# Patient Record
Sex: MALE | URBAN - METROPOLITAN AREA
[De-identification: names, ages, dates, MRNs, and addresses within clinical notes are randomized per-mention and may not be internally consistent; named-entity substitution may affect disease eponyms.]

---

## 2017-03-15 ENCOUNTER — ALLIED HEALTH/NURSE VISIT (OUTPATIENT)
Dept: PHARMACY | Facility: CLINIC | Age: 57
End: 2017-03-15
Payer: COMMERCIAL

## 2017-03-15 DIAGNOSIS — K21.9 GASTROESOPHAGEAL REFLUX DISEASE WITHOUT ESOPHAGITIS: ICD-10-CM

## 2017-03-15 DIAGNOSIS — I10 BENIGN ESSENTIAL HYPERTENSION: ICD-10-CM

## 2017-03-15 DIAGNOSIS — L73.9 FOLLICULITIS: ICD-10-CM

## 2017-03-15 DIAGNOSIS — G47.00 INSOMNIA, UNSPECIFIED TYPE: ICD-10-CM

## 2017-03-15 DIAGNOSIS — E78.5 HYPERLIPIDEMIA, UNSPECIFIED HYPERLIPIDEMIA TYPE: ICD-10-CM

## 2017-03-15 DIAGNOSIS — M19.90 ARTHRITIS: Primary | ICD-10-CM

## 2017-03-15 DIAGNOSIS — K59.03 DRUG-INDUCED CONSTIPATION: ICD-10-CM

## 2017-03-15 PROCEDURE — 99605 MTMS BY PHARM NP 15 MIN: CPT | Mod: U4 | Performed by: PHARMACIST

## 2017-03-15 PROCEDURE — 99607 MTMS BY PHARM ADDL 15 MIN: CPT | Mod: U4 | Performed by: PHARMACIST

## 2017-03-15 NOTE — PROGRESS NOTES
SUBJECTIVE/OBJECTIVE:                                                    Justin Stewart is a 56 year old male called for a follow-up visit for Medication Therapy Management.  He was referred to me from [facility]. His PCP is Dr. Higinio Marsh. This is patient's first visit this year.    Chief Complaint: none, follow up from our visit on 9/7/2016.      Tobacco: No tobacco use   Alcohol: 1-3 beverages / week    Medication Adherence: no issues reported by patient    Arthritis/Pain: Medication includes hydrocodone-acetaminophen 5-325 mg patient is taking one tablet daily for arthritis and a bulging disc in his back. He takes 1/2 tablet twice daily. He no longer is taking tramadol. Also using topical lidocaine ointment and diclofenac gel. Using Tylenol 500 - 1000 mg as needed, Cymbalta 60mg daily, Celebrex 200mg once daily. Current arthritis in lower lumbar and thoracic. Mobility is cut down but patient notes that his pain feels managed. Only taking Tylenol occasionally such as when golfing. Patient stated he had a problem with his kidney function so his PCP tried stopping Celebrex. Patient stated that his pain was too bad so he started retaking this again. Patient states that his labs came back and kidney function has since improved.    Hyperlipidemia: Current medications include atorvastatin 20mg once daily. Patient states no significant myalgias or other side effects. He is getting labs drawn next week - patient states will send labs to me.     Hypertension: Current medications include lisinopril 10mg once daily, propranolol ER 80mg once daily, HCTZ 25mg once daily in the morning. Patient denies any side effects and states that his BP is at goal of <140/90.     Insomnia: Current medications include trazodone 100mg at bedtime, working well. Patient does not report any side effects.     Folliculitis: Current medication includes doxycycline 50 mg twice daily - Oracea was not covered by insurance. Folliculitis is  stable, followed by dermatology - states the doxycycline is preventing the folliculitis from getting worse.     Reflux: Current medication includes generic esomeprazole 40 mg once daily. Patient states he is still waiting for a PA for brand name Nexium. He states that about 2 months ago he no longer is having problems with reflux, continues to take generic esomeprazole.     Constipation: patient was started on Movantik 25 mg once daily to prevent constipation while taking hydrocodone-acetaminophen. Not have problems with constipation. Tolerating well.    Current labs include:    Total Cholesterol: 120 mg/dl    6/4/2014  Triglycerides 152 mg/dl     6/4/2014  LDL  120 mg/dl   6/4/2014  HDL  30 mg/dl    6/4/2014    ASSESSMENT:                                                    Current medications were reviewed today as discussed above.      Medication Adherence: no issues identified    Arthritis/Pain: stable    Hyperlipidemia: Stable. Pt is on moderate intensity statin which is indicated based on 2013 ACC/AHA guidelines for lipid management.      Hypertension: Stable. Patient is meeting BP goal of < 140/90mmHg.     Insomnia: stable     Folliculitis: stable    Reflux: improved    Constipation: stable     PLAN:                                                      1. Continue current drug therapy.   2. Patient to send me his labs - he has a release of information form.     I spent 20 minutes with this patient today.  I offer these suggestions with the understanding that I don't fully understand Justin's past medical history and the complexity of his health conditions. Justin should make no changes without the approval of his physician. A copy of the visit note was provided to the patient's primary care provider.     Will follow up in 6 months.    The patient was mailed a summary of these recommendations as an after visit summary.    Nicky Zhao, PharmD  Medication Therapy Management

## 2017-03-15 NOTE — MR AVS SNAPSHOT
"              After Visit Summary   3/15/2017    Justin Stewart    MRN: [id number]           Patient Information     Date Of Birth          1960        Visit Information        Provider Department      3/15/2017 3:30 PM Nicky Zhao [facility] [place]        Today's Diagnoses     Arthritis    -  1    Benign essential hypertension        Folliculitis        Hyperlipidemia, unspecified hyperlipidemia type        Gastroesophageal reflux disease without esophagitis        Insomnia, unspecified type        Drug-induced constipation          Care Instructions    Kelvin,    Today we discussed the followin. Continue current drug therapy.   2. Please send me your labs when you get them.     We should follow up in 6 months.    Nicky Zhao, PharmD  [phone] in clinic on Tuesday and Wednesday              Follow-ups after your visit        Who to contact     If you have questions or need follow up information about today's clinic visit or your schedule please contact [facility] [place] directly at [phone].  Normal or non-critical lab and imaging results will be communicated to you by MyChart, letter or phone within 4 business days after the clinic has received the results. If you do not hear from us within 7 days, please contact the clinic through Viv[employer]hart or phone. If you have a critical or abnormal lab result, we will notify you by phone as soon as possible.  Submit refill requests through [employer] or call your pharmacy and they will forward the refill request to us. Please allow 3 business days for your refill to be completed.          Additional Information About Your Visit        Viv[employer]hart Information     [employer] lets you send messages to your doctor, view your test results, renew your prescriptions, schedule appointments and more. To sign up, go to [email or web address] . Click on \"Log in\" on the left side of the screen, which will take you to the Welcome " "page. Then click on \"Sign up Now\" on the right side of the page.     You will be asked to enter the access code listed below, as well as some personal information. Please follow the directions to create your username and password.     Your access code is: [email or web address]  Expires: 2017  9:02 AM     Your access code will  in 90 days. If you need help or a new code, please call your [place] clinic or [phone].        Care EveryWhere ID     This is your Care EveryWhere ID. This could be used by other organizations to access your [place] medical records  [id number]         Blood Pressure from Last 3 Encounters:   No data found for BP    Weight from Last 3 Encounters:   No data found for Wt              Today, you had the following     No orders found for display         Today's Medication Changes          These changes are accurate as of: 3/15/17 11:59 PM.  If you have any questions, ask your nurse or doctor.               These medicines have changed or have updated prescriptions.        Dose/Directions    traZODone 100 MG tablet   Commonly known as:  DESYREL   This may have changed:  Another medication with the same name was removed. Continue taking this medication, and follow the directions you see here.        Dose:  100 mg   Take 100 mg by mouth At Bedtime   Refills:  0         Stop taking these medicines if you haven't already. Please contact your care team if you have questions.     COLACE 50 MG capsule   Generic drug:  docusate sodium           traMADol 50 MG tablet   Commonly known as:  ULTRAM                    Primary Care Provider    None Specified       No primary provider on file.        Thank you!     Thank you for choosing [facility]  for your care. Our goal is always to provide you with excellent care. Hearing back from our patients is one way we can continue to improve our services. Please take a few minutes to complete the written survey that you may receive in " the mail after your visit with us. Thank you!             Your Updated Medication List - Protect others around you: Learn how to safely use, store and throw away your medicines at www.disposemymeds.org.          This list is accurate as of: 3/15/17 11:59 PM.  Always use your most recent med list.                   Brand Name Dispense Instructions for use    acetaminophen 500 MG tablet    TYLENOL     Take 500-1,000 mg by mouth every 6 hours as needed for mild pain (do not exceed 3,000 mg acetaminophen daily)       atorvastatin 40 MG tablet    LIPITOR     Take 20 mg by mouth daily       celecoxib 200 MG capsule    celeBREX     Take 200 mg by mouth every morning       diclofenac sodium 3 % Gel      Place onto the skin 2 times daily       doxycycline 50 MG capsule    VIBRAMYCIN     Take 50 mg by mouth 2 times daily       DULoxetine 60 MG EC capsule    CYMBALTA     Take 60 mg by mouth daily       esomeprazole 40 MG CR capsule    nexIUM     Take 40 mg by mouth every morning (before breakfast) Take 30-60 minutes before eating.       hydrochlorothiazide 25 MG tablet    HYDRODIURIL     Take 25 mg by mouth every morning       HYDROcodone-acetaminophen 5-325 MG per tablet    NORCO     Take 1 tablet by mouth daily       lidocaine 5 % ointment    XYLOCAINE     Apply topically as needed for moderate pain       lisinopril 10 MG tablet    PRINIVIL/ZESTRIL     Take 10 mg by mouth daily       MOVANTIK 25 MG Tabs tablet   Generic drug:  naloxegol      Take 25 mg by mouth every morning (before breakfast)       propranolol 80 MG 24 hr capsule    INDERAL LA     Take 80 mg by mouth daily       traZODone 100 MG tablet    DESYREL     Take 100 mg by mouth At Bedtime

## 2017-03-20 RX ORDER — TRAZODONE HYDROCHLORIDE 100 MG/1
100 TABLET ORAL AT BEDTIME
COMMUNITY

## 2017-03-20 RX ORDER — HYDROCODONE BITARTRATE AND ACETAMINOPHEN 5; 325 MG/1; MG/1
1 TABLET ORAL DAILY PRN
COMMUNITY
End: 2020-10-06

## 2017-03-20 RX ORDER — LIDOCAINE 50 MG/G
OINTMENT TOPICAL PRN
COMMUNITY

## 2017-03-20 NOTE — PATIENT INSTRUCTIONS
Kelvin,    Today we discussed the followin. Continue current drug therapy.   2. Please send me your labs when you get them.     We should follow up in 6 months.    Nicky Zhao, PharmD  [phone] in clinic on Tuesday and Wednesday

## 2017-04-10 ENCOUNTER — RX ONLY (OUTPATIENT)
Age: 57
Setting detail: RX ONLY
End: 2017-04-10

## 2017-10-03 ENCOUNTER — ALLIED HEALTH/NURSE VISIT (OUTPATIENT)
Dept: PHARMACY | Facility: CLINIC | Age: 57
End: 2017-10-03
Payer: COMMERCIAL

## 2017-10-03 DIAGNOSIS — L73.9 FOLLICULITIS: ICD-10-CM

## 2017-10-03 DIAGNOSIS — K21.9 GASTROESOPHAGEAL REFLUX DISEASE WITHOUT ESOPHAGITIS: ICD-10-CM

## 2017-10-03 DIAGNOSIS — M19.90 ARTHRITIS: Primary | ICD-10-CM

## 2017-10-03 DIAGNOSIS — E78.5 HYPERLIPIDEMIA, UNSPECIFIED HYPERLIPIDEMIA TYPE: ICD-10-CM

## 2017-10-03 DIAGNOSIS — K59.03 DRUG-INDUCED CONSTIPATION: ICD-10-CM

## 2017-10-03 DIAGNOSIS — I10 BENIGN ESSENTIAL HYPERTENSION: ICD-10-CM

## 2017-10-03 DIAGNOSIS — G47.00 INSOMNIA, UNSPECIFIED TYPE: ICD-10-CM

## 2017-10-03 PROCEDURE — 99606 MTMS BY PHARM EST 15 MIN: CPT | Mod: U4 | Performed by: PHARMACIST

## 2017-10-03 PROCEDURE — 99607 MTMS BY PHARM ADDL 15 MIN: CPT | Mod: U4 | Performed by: PHARMACIST

## 2017-10-03 NOTE — MR AVS SNAPSHOT
After Visit Summary   10/3/2017    Justin Stewart    MRN: [id number]           Patient Information     Date Of Birth          1960        Visit Information        Provider Department      10/3/2017 3:00 PM Nicky Zhao, [facility] [place]        Today's Diagnoses     Arthritis    -  1    Benign essential hypertension        Hyperlipidemia, unspecified hyperlipidemia type        Folliculitis        Insomnia, unspecified type        Gastroesophageal reflux disease without esophagitis        Drug-induced constipation          Care Instructions    Kelvin,    Today we discuss the followin. Discuss with your doctor about possibly switching from pantoprazole to generic Nexium (esomeprazole) 40 mg daily. You could also try using ranitidine 150 mg twice daily as needed for breakthrough symptoms.   2. Thanks for sending me your labs!!     Nicky Zhao, PharmD  [facility]   [address] [place] [address]    [phone] in clinic on Tuesday and Wednesday                Follow-ups after your visit        Who to contact     If you have questions or need follow up information about today's clinic visit or your schedule please contact [facility] [place] directly at [phone].  Normal or non-critical lab and imaging results will be communicated to you by MyChart, letter or phone within 4 business days after the clinic has received the results. If you do not hear from us within 7 days, please contact the clinic through Pi[employer]art or phone. If you have a critical or abnormal lab result, we will notify you by phone as soon as possible.  Submit refill requests through [employer] or call your pharmacy and they will forward the refill request to us. Please allow 3 business days for your refill to be completed.          Additional Information About Your Visit        Pi[employer]rhart Information     [employer] lets you send messages to your doctor, view your  "test results, renew your prescriptions, schedule appointments and more. To sign up, go to [email or web address] . Click on \"Log in\" on the left side of the screen, which will take you to the Welcome page. Then click on \"Sign up Now\" on the right side of the page.     You will be asked to enter the access code listed below, as well as some personal information. Please follow the directions to create your username and password.     Your access code is: [email or web address]  Expires: 1/3/2018  6:48 PM     Your access code will  in 90 days. If you need help or a new code, please call your [place] clinic or [phone].        Care EveryWhere ID     This is your Care EveryWhere ID. This could be used by other organizations to access your [facility] medical records  [id number]         Blood Pressure from Last 3 Encounters:   No data found for BP    Weight from Last 3 Encounters:   No data found for Wt              Today, you had the following     No orders found for display         Today's Medication Changes          These changes are accurate as of: 10/3/17 11:59 PM.  If you have any questions, ask your nurse or doctor.               Stop taking these medicines if you haven't already. Please contact your care team if you have questions.     esomeprazole 40 MG CR capsule   Commonly known as:  nexIUM                    Primary Care Provider    None Specified       No primary provider on file.        Equal Access to Services     [facility] CampusIS[facility]K : Hadii abhijit Quinn, waaxda joe, qaybta kaalmabony em, ginger romero . So [facility] [phone].    ATENCIÓN: Si habla español, tiene a venegas disposición servicios gratuitos de asistencia lingüística. Gilberto al [phone].    We comply with applicable federal civil rights laws and [place] laws. We do not discriminate on the basis of race, color, national origin, age, disability, sex, sexual orientation, or gender identity.            Thank " you!     Thank you for choosing [facility]  for your care. Our goal is always to provide you with excellent care. Hearing back from our patients is one way we can continue to improve our services. Please take a few minutes to complete the written survey that you may receive in the mail after your visit with us. Thank you!             Your Updated Medication List - Protect others around you: Learn how to safely use, store and throw away your medicines at www.disposemymeds.org.          This list is accurate as of: 10/3/17 11:59 PM.  Always use your most recent med list.                   Brand Name Dispense Instructions for use Diagnosis    acetaminophen 500 MG tablet    TYLENOL     Take 500-1,000 mg by mouth every 6 hours as needed for mild pain (do not exceed 3,000 mg acetaminophen daily)        atorvastatin 40 MG tablet    LIPITOR     Take 20 mg by mouth daily        celecoxib 200 MG capsule    celeBREX     Take 200 mg by mouth every morning        diclofenac sodium 3 % Gel      Place onto the skin 2 times daily        doxycycline 50 MG capsule    VIBRAMYCIN     Take 50 mg by mouth 2 times daily        DULoxetine 60 MG EC capsule    CYMBALTA     Take 60 mg by mouth daily        hydrochlorothiazide 25 MG tablet    HYDRODIURIL     Take 25 mg by mouth every morning        HYDROcodone-acetaminophen 5-325 MG per tablet    NORCO     Take 1 tablet by mouth daily        lidocaine 5 % ointment    XYLOCAINE     Apply topically as needed for moderate pain        lisinopril 10 MG tablet    PRINIVIL/ZESTRIL     Take 10 mg by mouth daily        MOVANTIK 25 MG Tabs tablet   Generic drug:  naloxegol      Take 25 mg by mouth every morning (before breakfast)        pantoprazole 40 MG EC tablet    PROTONIX     Take 40 mg by mouth daily        propranolol 80 MG 24 hr capsule    INDERAL LA     Take 80 mg by mouth daily        traZODone 100 MG tablet    DESYREL     Take 100 mg by mouth At Bedtime

## 2017-10-03 NOTE — PROGRESS NOTES
SUBJECTIVE/OBJECTIVE:                Justin Stewart is a 56 year old male called for a follow-up visit for Medication Therapy Management.  He was referred to me from [facility]. PCP is Dr. Higinio Marsh.    Chief Complaint: Follow up from our visit on 3/15/2017. General med review. Pt currently on course of ciprofloxacin for infection prophylaxis due to having ingrown toenail repaired.    Tobacco: No tobacco use   Alcohol: 1-3 beverages / week    Medication Adherence: no issues reported    Arthritis/Pain: Medication includes hydrocodone-acetaminophen 5-325 mg patient is taking one tablet daily for arthritis and a bulging disc in his back. He takes 1/4  tablet twice daily and 1/2 tablet once daily. Also using topical lidocaine ointment and diclofenac gel. Using Tylenol 500 - 1000 mg as needed - taking 2-4 tablets daily, Cymbalta 60mg daily, Celebrex 200mg once daily. Current arthritis in lower lumbar and thoracic. Mobility is cut down but patient notes that his pain feels managed.     Hyperlipidemia: Current medications include atorvastatin 20mg once daily. Patient states no significant myalgias or other side effects. Labs were never sent by his clinic, so he will send them.    Hypertension: Current medications include lisinopril 10mg once daily, propranolol ER 80mg once daily, HCTZ 25mg once daily in the morning. Patient denies any side effects and states that his BP is usually around 120/85 mmHg and pulse is in the 70's.     Insomnia: Current medications include trazodone 50-100mg at bedtime, working well. Patient does not report any side effects. If pt still cannot sleep he will take another 1/2 tablet of trazodone (50mg) or another pain pill (Norco).    Folliculitis: Current medication includes doxycycline 50 mg twice daily. Folliculitis is stable, followed by dermatology - states the doxycycline is preventing the folliculitis from getting worse.     Reflux: Current medication includes pantoprazole. Was  previously stable on esomeprazole 40 mg daily but switched because pt's insurance wouldn't cover brand name Nexium. States pantoprazole works 50% of the time.    Constipation: Patient taking Movantik 25 mg once daily to prevent constipation while taking hydrocodone-acetaminophen. Not having problems with constipation. Tolerating well.    Total Cholesterol: 120 mg/dl    6/4/2014  Triglycerides 152 mg/dl     6/4/2014  LDL  120 mg/dl   6/4/2014  HDL  30 mg/dl    6/4/2014    ASSESSMENT:                                                    Current medications were reviewed today as discussed above.      Medication Adherence: no issues identified    Arthritis/Pain: Stable.    Hyperlipidemia: Stable. Pt is on moderate intensity statin which is indicated based on 2013 ACC/AHA guidelines for lipid management.      Hypertension: Stable. Patient is meeting BP goal of < 140/90mmHg.     Insomnia: Stable.     Folliculitis: Stable.    Reflux: Needs improvement. Pantoprazole is not as effective for treating patient's GERD as esomeprazole was. Reviewed with patient that he could discuss switching to generic esomeprazole and/or use ranitidine as needed for breakthrough symptoms.    Constipation: Stable.     PLAN:                  1. Discuss with your doctor about possibly switching from pantoprazole to generic esomeprazole 40 mg daily. You could also try using ranitidine 150 mg twice daily as needed for breakthrough symptoms.   2. Patient will send me his labs.     I spent 20 minutes with this patient today. All changes were made via collaborative practice agreement with No primary care provider on file.. A copy of the visit note was provided to the patient's primary care provider.     Will follow up in 6 months.    The patient was mailed a summary of these recommendations as an after visit summary.    Carlee Gomez, Pharm.D. Student  Nicky Zhao, PharmD  Medication Therapy Management

## 2017-10-05 RX ORDER — PANTOPRAZOLE SODIUM 40 MG/1
40 TABLET, DELAYED RELEASE ORAL DAILY
COMMUNITY
End: 2018-04-24 | Stop reason: ALTCHOICE

## 2017-10-05 NOTE — PATIENT INSTRUCTIONS
Kelvin,    Today we discuss the followin. Discuss with your doctor about possibly switching from pantoprazole to generic Nexium (esomeprazole) 40 mg daily. You could also try using ranitidine 150 mg twice daily as needed for breakthrough symptoms.   2. Thanks for sending me your labs!!     Nicky Zhao, PharmD  [address] [place] [address]    [phone] in clinic on Tuesday and Wednesday

## 2018-04-24 ENCOUNTER — ALLIED HEALTH/NURSE VISIT (OUTPATIENT)
Dept: PHARMACY | Facility: CLINIC | Age: 58
End: 2018-04-24
Payer: COMMERCIAL

## 2018-04-24 DIAGNOSIS — L73.9 FOLLICULITIS: ICD-10-CM

## 2018-04-24 DIAGNOSIS — G47.00 INSOMNIA, UNSPECIFIED TYPE: ICD-10-CM

## 2018-04-24 DIAGNOSIS — I10 BENIGN ESSENTIAL HYPERTENSION: ICD-10-CM

## 2018-04-24 DIAGNOSIS — K21.9 GASTROESOPHAGEAL REFLUX DISEASE WITHOUT ESOPHAGITIS: ICD-10-CM

## 2018-04-24 DIAGNOSIS — K59.03 DRUG-INDUCED CONSTIPATION: ICD-10-CM

## 2018-04-24 DIAGNOSIS — E78.5 HYPERLIPIDEMIA, UNSPECIFIED HYPERLIPIDEMIA TYPE: ICD-10-CM

## 2018-04-24 DIAGNOSIS — M19.90 ARTHRITIS: Primary | ICD-10-CM

## 2018-04-24 PROCEDURE — 99607 MTMS BY PHARM ADDL 15 MIN: CPT | Mod: U4 | Performed by: PHARMACIST

## 2018-04-24 PROCEDURE — 99605 MTMS BY PHARM NP 15 MIN: CPT | Mod: U4 | Performed by: PHARMACIST

## 2018-04-24 RX ORDER — MELOXICAM 7.5 MG/1
7.5 TABLET ORAL DAILY
COMMUNITY

## 2018-04-24 RX ORDER — ESOMEPRAZOLE MAGNESIUM 40 MG/1
40 CAPSULE, DELAYED RELEASE ORAL
COMMUNITY
End: 2019-03-27

## 2018-04-24 RX ORDER — CARVEDILOL 3.12 MG/1
3.12 TABLET ORAL 2 TIMES DAILY WITH MEALS
COMMUNITY
End: 2020-10-06

## 2018-04-24 NOTE — PROGRESS NOTES
SUBJECTIVE/OBJECTIVE:                Justin Stewart is a 57 year old male called for a follow-up visit for Medication Therapy Management.  He was referred to me from [facility]. PCP is Dr. Higinio Marsh.This is patient's first visit this year.     Chief Complaint: no complaints. Follow up from our visit on 10/03/2017.     Toobacco: No tobacco use   Alcohol: 1-3 beverages / week    Medication Adherence: no issues reported    Arthritis/Pain: Medication includes hydrocodone-acetaminophen 5-325 mg patient is taking one tablet daily for arthritis and a bulging disc in his back. He takes 1/4 tablet twice daily and 1/2 tablet once daily. Also using topical lidocaine ointment and diclofenac gel. Using Tylenol 500 - 1000 mg as needed - taking 2-4 tablets daily - not using very often. Also taking Cymbalta 60mg daily and meloxicam 7.5 mg daily. Current arthritis in lower lumbar and thoracic. Patient feels his pain is managed.     Hyperlipidemia: Current medications include atorvastatin 20mg once daily. Patient states no significant myalgias or other side effects. Patient does not have lab values, have tried several times to obtain these - he did review labs with his PCP and was told his cholesterol is way down and all else is good.     Hypertension: Current medications include lisinopril 10mg once daily, carvedilol 3.125 mg twice daily, HCTZ 25mg once daily in the morning. Patient previously on propranolol - this was changed to carvedilol due to patient blacking out - unclear why patient blacked out, pt thinks he may just have been too dehydrated. Patient denies any side effects and states that his BP is usually around 120/85 mmHg and pulse is in the 70's.     Insomnia: Current medications include trazodone 100mg at bedtime. Patient states having difficulty falling asleep. He wakes up during the night several times but falls back asleep fine during the night. He was using 1/2 tablet (50 mg) at bedtime, now using a full  tablet (100 mg) and no longer working as well.      Folliculitis: Current medication includes doxycycline 50 mg twice daily. Folliculitis is stable, followed by dermatology - states the doxycycline is preventing the folliculitis from getting worse.     Reflux: Current medication includes esomeprazole 40 mg daily. His insurance currently is covering this. Patient feels it works better than pantoprazole.     Constipation: Patient taking Symproic 0.2 mg daily to prevent constipation while taking hydrocodone-acetaminophen. Not having problems with constipation. Tolerating well.    Total Cholesterol: 120 mg/dl    6/4/2014  Triglycerides 152 mg/dl     6/4/2014  LDL  120 mg/dl   6/4/2014  HDL  30 mg/dl    6/4/2014    ASSESSMENT:              Current medications were reviewed today as discussed above.      Medication Adherence: excellent, no issues identified    Arthritis/Pain: stable.      Hyperlipidemia: Stable. Pt is on moderate intensity statin which is indicated based on 2013 ACC/AHA guidelines for lipid management.      Hypertension: Stable. Patient is meeting BP goal of < 140/90mmHg.     Insomnia: needs improvement. Recommend patient discuss with his PCP. Possibly consider increase in trazodone dose. Another option is addition of melatonin - could consider increase in trazodone to 150 mg at bedtime and use melatonin 3-5 mg as needed if still having some nights not able to sleep.      Folliculitis: stable     Reflux: stable     Constipation: stable     PLAN:                  1. Patient to discuss possibly increasing trazodone dose with PCP.   2. Addition of melatonin 3-5 mg at bedtime as needed may be another option. Could consider increase in trazodone to 150 mg at bedtime and use melatonin as needed for those nights still not able to sleep.     I spent 30 minutes with this patient today. I offer these suggestions with the understanding that I don't fully understand Justin's past medical history and the complexity  of his health conditions. Justin should make no changes without the approval of his physician. A copy of the visit note was provided to the patient's primary care provider.     Will follow up in 6 months.    The patient was mailed a summary of these recommendations as an after visit summary.    Nicky Zhao, PharmD  Medication Therapy Management

## 2018-04-24 NOTE — MR AVS SNAPSHOT
After Visit Summary   2018    Justin Stewart    MRN: [id number]           Patient Information     Date Of Birth          1960        Visit Information        Provider Department      2018 1:00 PM Nicky Zhao [facility] [place]        Today's Diagnoses     Arthritis    -  1    Hyperlipidemia, unspecified hyperlipidemia type        Benign essential hypertension        Insomnia, unspecified type        Folliculitis        Gastroesophageal reflux disease without esophagitis        Drug-induced constipation          Care Instructions    Kelvin,    Today we discussed the followin. Please discuss with your doctor about possibly increasing your trazodone dose.   2. Addition of melatonin 3-5 mg at bedtime as needed may be another option. Could consider increase in trazodone to 150 mg at bedtime and use melatonin as needed for those nights still not able to sleep.     We should follow up in 6 months.    Nicky Zhao, PharmD  [phone] in clinic on Tuesday and Wednesday            Follow-ups after your visit        Who to contact     If you have questions or need follow up information about today's clinic visit or your schedule please contact [facility] MTM directly at [phone].  Normal or non-critical lab and imaging results will be communicated to you by MyChart, letter or phone within 4 business days after the clinic has received the results. If you do not hear from us within 7 days, please contact the clinic through [facility] [employer] [facility] or phone. If you have a critical or abnormal lab result, we will notify you by phone as soon as possible.  Submit refill requests through [employer] or call your pharmacy and they will forward the refill request to us. Please allow 3 business days for your refill to be completed.          Additional Information About Your Visit        [facility] Information     [employer] lets you send messages to your doctor, view your  "test results, renew your prescriptions, schedule appointments and more. To sign up, go to [email or web address] . Click on \"Log in\" on the left side of the screen, which will take you to the Welcome page. Then click on \"Sign up Now\" on the right side of the page.     You will be asked to enter the access code listed below, as well as some personal information. Please follow the directions to create your username and password.     Your access code is: [email or web address]  Expires: 2018  9:42 AM     Your access code will  in 90 days. If you need help or a new code, please call your [facility] clinic or [phone].        Care EveryWhere ID     This is your Care EveryWhere ID. This could be used by other organizations to access your [facility] medical records  [id number]         Blood Pressure from Last 3 Encounters:   No data found for BP    Weight from Last 3 Encounters:   No data found for Wt              Today, you had the following     No orders found for display         Today's Medication Changes          These changes are accurate as of 18 11:59 PM.  If you have any questions, ask your nurse or doctor.               Stop taking these medicines if you haven't already. Please contact your care team if you have questions.     celecoxib 200 MG capsule   Commonly known as:  celeBREX           MOVANTIK 25 MG Tabs tablet   Generic drug:  naloxegol           pantoprazole 40 MG EC tablet   Commonly known as:  PROTONIX           propranolol 80 MG 24 hr capsule   Commonly known as:  INDERAL LA                    Primary Care Provider    None Specified       No primary provider on file.        Equal Access to Services     [facility]: Hadii abhijit harrison hadasho Solyricali, waaxda luqadaha, qaybta kaalmada marleenyada, ginger romero . So [facility] [phone].    ATENCIÓN: Si habla español, tiene a venegas disposición servicios gratuitos de asistencia lingüística. Llame al [phone].    We comply with " applicable federal civil rights laws and [place] laws. We do not discriminate on the basis of race, color, national origin, age, disability, sex, sexual orientation, or gender identity.            Thank you!     Thank you for choosing [facility]  for your care. Our goal is always to provide you with excellent care. Hearing back from our patients is one way we can continue to improve our services. Please take a few minutes to complete the written survey that you may receive in the mail after your visit with us. Thank you!             Your Updated Medication List - Protect others around you: Learn how to safely use, store and throw away your medicines at www.disposemymeds.org.          This list is accurate as of 4/24/18 11:59 PM.  Always use your most recent med list.                   Brand Name Dispense Instructions for use Diagnosis    acetaminophen 500 MG tablet    TYLENOL     Take 500-1,000 mg by mouth every 6 hours as needed for mild pain (do not exceed 3,000 mg acetaminophen daily)        atorvastatin 40 MG tablet    LIPITOR     Take 20 mg by mouth daily        carvedilol 3.125 MG tablet    COREG     Take 3.125 mg by mouth 2 times daily (with meals)        diclofenac sodium 3 % Gel      Place onto the skin 2 times daily        doxycycline 50 MG capsule    VIBRAMYCIN     Take 50 mg by mouth 2 times daily        DULoxetine 60 MG EC capsule    CYMBALTA     Take 60 mg by mouth daily        esomeprazole 40 MG CR capsule    nexIUM     Take 40 mg by mouth every morning (before breakfast) Take 30-60 minutes before eating.        hydrochlorothiazide 25 MG tablet    HYDRODIURIL     Take 25 mg by mouth every morning        HYDROcodone-acetaminophen 5-325 MG per tablet    NORCO     Take 1 tablet by mouth daily        lidocaine 5 % ointment    XYLOCAINE     Apply topically as needed for moderate pain        lisinopril 10 MG tablet    PRINIVIL/ZESTRIL     Take 10 mg by mouth daily        meloxicam  7.5 MG tablet    MOBIC     Take 7.5 mg by mouth daily        SYMPROIC 0.2 MG Tabs   Generic drug:  Naldemedine Tosylate      Take 1 tablet by mouth daily        traZODone 100 MG tablet    DESYREL     Take 100 mg by mouth At Bedtime

## 2018-04-25 NOTE — PATIENT INSTRUCTIONS
Kelvin,    Today we discussed the followin. Please discuss with your doctor about possibly increasing your trazodone dose.   2. Addition of melatonin 3-5 mg at bedtime as needed may be another option. Could consider increase in trazodone to 150 mg at bedtime and use melatonin as needed for those nights still not able to sleep.     We should follow up in 6 months.    Nicky Zhao, PharmD  [phone] in clinic on Tuesday and Wednesday

## 2018-07-18 NOTE — PROGRESS NOTES
The patient's insurance does cover for esomeprazole (generic). He is currently taking esomeprazole and states that it is working well on 4/24/18 visit.    Sharee James, PharmD Student

## 2019-03-26 ENCOUNTER — ALLIED HEALTH/NURSE VISIT (OUTPATIENT)
Dept: PHARMACY | Facility: CLINIC | Age: 59
End: 2019-03-26
Payer: COMMERCIAL

## 2019-03-26 DIAGNOSIS — L73.9 FOLLICULITIS: ICD-10-CM

## 2019-03-26 DIAGNOSIS — G47.00 INSOMNIA, UNSPECIFIED TYPE: ICD-10-CM

## 2019-03-26 DIAGNOSIS — E78.5 HYPERLIPIDEMIA, UNSPECIFIED HYPERLIPIDEMIA TYPE: ICD-10-CM

## 2019-03-26 DIAGNOSIS — K21.9 GASTROESOPHAGEAL REFLUX DISEASE WITHOUT ESOPHAGITIS: ICD-10-CM

## 2019-03-26 DIAGNOSIS — M19.90 ARTHRITIS: Primary | ICD-10-CM

## 2019-03-26 DIAGNOSIS — I10 BENIGN ESSENTIAL HYPERTENSION: ICD-10-CM

## 2019-03-26 PROCEDURE — 99607 MTMS BY PHARM ADDL 15 MIN: CPT | Mod: U4 | Performed by: PHARMACIST

## 2019-03-26 PROCEDURE — 99605 MTMS BY PHARM NP 15 MIN: CPT | Mod: U4 | Performed by: PHARMACIST

## 2019-03-26 NOTE — PROGRESS NOTES
SUBJECTIVE/OBJECTIVE:                Justin Stewart is a 57 year old male called for a follow-up visit for Medication Therapy Management.  He was referred to me from [facility]. PCP is Dr. Higinio Marsh. This is patient's first visit this year. Patient gave verbal consent for [facility] Participation Agreement Form. I faxed the form to .     Chief Complaint: no complaints. Follow up from our visit on 4/24/2018.     Toobacco: No tobacco use   Alcohol: 1-3 beverages / week    Medication Adherence: no issues reported    Arthritis/Pain: Medication includes medical cannabis, hydrocodone-acetaminophen 5-325 mg 1/4 tablet occasionally for arthritis and a bulging disc in his back. Also using  diclofenac gel, Cymbalta 60mg daily and meloxicam 7.5 mg daily. Current arthritis in lower lumbar and thoracic. Patient feels his pain is managed. He is followed by pain clinic in [place]. He is also taking Methocarbamol 500 mg twice a day - prescribed by podiatry for joint pain in his toe. Patient lives in [place]. He feels his pain is stable - but side effects improved.      Hyperlipidemia: Current medications include atorvastatin 20mg once daily. Patient states no significant myalgias or other side effects. Patient does not have lab values, have tried several times to obtain these - he did review labs with his PCP yesterday and was told labs are good.     Hypertension: Current medications include lisinopril 10mg once daily, carvedilol 3.125 mg twice daily, HCTZ 25mg once daily in the morning. Patient denies any side effects and states that his BP yesterday was 118/80's.      Insomnia: Current medications include trazodone 100mg at bedtime. Patient states working well - he is sleeping well.     Folliculitis: Current medication includes doxycycline 50 mg twice daily. Folliculitis is stable, followed by dermatology - states the doxycycline is preventing the folliculitis from getting worse.      Reflux: Current medication includes omeprazole 40 mg daily. Working pretty well, but not as well as Nexium. He states he notices breakthrough symptoms after taking his morning pills. He is eating first then taking his omeprazole with his other pills.     ASSESSMENT:              Current medications were reviewed today as discussed above.      Medication Adherence: excellent, no issues identified    Arthritis/Pain: stable    Hyperlipidemia: stable    Hypertension: stable    Insomnia: stable    Folliculitis: stable    Reflux: Reviewed try taking omeprazole 1/2 hour before eating breakfast and taking other medications. If still having breakthrough symptoms - consider trying OTC ranitidine 150 mg once to twice daily.      PLAN:                  1. Try taking your omeprazole 1/2 hour before eating breakfast and taking your other morning medications. If still having breakthrough symptoms - consider trying OTC ranitidine 150 mg once to twice daily.     I spent 30 minutes with this patient today. I offer these suggestions with the understanding that I don't fully understand Justin's past medical history and the complexity of his health conditions. Justin should make no changes without the approval of his physician. A copy of the visit note was provided to the patient's primary care provider.     Will follow up in 6 months.    The patient was mailed a summary of these recommendations as an after visit summary.    Nicky Zhao, PharmD  Medication Therapy Management

## 2019-03-27 RX ORDER — OMEPRAZOLE 40 MG/1
40 CAPSULE, DELAYED RELEASE ORAL DAILY
COMMUNITY

## 2019-03-27 RX ORDER — METHOCARBAMOL 500 MG/1
500 TABLET, FILM COATED ORAL 2 TIMES DAILY
COMMUNITY
End: 2020-10-06

## 2019-03-27 NOTE — PATIENT INSTRUCTIONS
Kelvin,     It was a pleasure talking with you today. Today we discussed the followin. Try taking your omeprazole 1/2 hour before eating breakfast and taking your other morning medications. If still having breakthrough symptoms - consider trying over-the-counter ranitidine 150 mg once to twice daily.     We should follow up in 6 months.    Keep up the good work!!      Nicky Zhao, PharmD  [phone] in clinic on Tuesday and Wednesday

## 2020-10-06 ENCOUNTER — VIRTUAL VISIT (OUTPATIENT)
Dept: PHARMACY | Facility: CLINIC | Age: 60
End: 2020-10-06
Payer: COMMERCIAL

## 2020-10-06 DIAGNOSIS — M19.90 ARTHRITIS: Primary | ICD-10-CM

## 2020-10-06 DIAGNOSIS — E78.5 HYPERLIPIDEMIA, UNSPECIFIED HYPERLIPIDEMIA TYPE: ICD-10-CM

## 2020-10-06 DIAGNOSIS — K21.9 GASTROESOPHAGEAL REFLUX DISEASE WITHOUT ESOPHAGITIS: ICD-10-CM

## 2020-10-06 DIAGNOSIS — I10 BENIGN ESSENTIAL HYPERTENSION: ICD-10-CM

## 2020-10-06 DIAGNOSIS — G47.00 INSOMNIA, UNSPECIFIED TYPE: ICD-10-CM

## 2020-10-06 PROCEDURE — 99605 MTMS BY PHARM NP 15 MIN: CPT | Mod: TEL | Performed by: PHARMACIST

## 2020-10-06 PROCEDURE — 99607 MTMS BY PHARM ADDL 15 MIN: CPT | Mod: TEL | Performed by: PHARMACIST

## 2020-10-06 RX ORDER — METOPROLOL SUCCINATE 25 MG/1
25 TABLET, EXTENDED RELEASE ORAL DAILY
COMMUNITY

## 2020-10-06 NOTE — PROGRESS NOTES
MTM ENCOUNTER  SUBJECTIVE/OBJECTIVE:                           Justin Stewart is a 59 year old male called for a follow-up visit. He was referred to me from [facility]. Today's visit is a follow-up MTM visit from 3/26/2019.     Patient consented to a telehealth visit: yes  Telemedicine Visit Details  Type of service:  Telephone visit  Start Time: 11:02 AM  End Time: 11:22 AM  Originating Location (pt. Location): Home  Distant Location (provider location):  [facility] [place]  Mode of Communication:  Telephone    Chief Complaint: none.    Tobacco: He reports that he has never smoked. He does not have any smokeless tobacco history on file.  Alcohol: 1-3 beverages / week    Medication Adherence/Access: no issues reported    Arthritis/Pain: Medication includes medical cannabis - once daily at bedtime. Also using  diclofenac gel, Cymbalta 60mg daily and meloxicam 7.5 mg daily. Using lidocaine ointment as needed. Current arthritis in lower lumbar and thoracic. Patient feels his pain is well managed.    Hyperlipidemia: Current medications include atorvastatin 20mg once daily. Patient states no significant myalgias or other side effects. Patient is seeing a new doctor - hasn't gotten labs yet.    Hypertension: Current medications include lisinopril 10mg once daily, metoprolol XL 25 mg daily and HCTZ 25mg once daily in the morning. Patient denies any side effects and states that his BP has been at goal of <140/90.    Insomnia: Current medications include trazodone 100mg at bedtime. Patient states working well - he is sleeping well.     Reflux: Current medication includes omeprazole 40 mg daily. Taking omeprazole 1/2 hour before eating breakfast - reflux is improved. Has occasional nighttime stomach upset - drinks a Ginger ale - working well.     Today's Vitals: There were no vitals taken for this visit.      ASSESSMENT:                            Medication Adherence: No issues identified    Arthritis/Pain:  stable    Hyperlipidemia: stable    Hypertension: stable    Insomnia: stable    Reflux: stable    PLAN:                            1. Continue current drug therapy.    I spent 20 minutes with this patient today. A copy of the visit note was provided to the patient's primary care provider.    Will follow up in 6-12 months.    The patient was mailed a summary of these recommendations.     Nicky Zhao, PharmD  Medication Therapy Management

## 2020-10-06 NOTE — PATIENT INSTRUCTIONS
Recommendations from today's MTM visit:                                                      Manny Warren,    It was a pleasure talking with you today! We discussed the followin. Continue your current drug therapy.    It was great to speak with you today.  I value your experience and would be very thankful for your time with providing feedback on our clinic survey. You may receive a survey via email or text message in the next few days.     Next MTM visit: We should follow up in 6-12 months.    To schedule another MTM appointment, please call the clinic directly or you may call the MTM scheduling line at [phone] or toll-free at [phone].     My Clinical Pharmacist's contact information:                                                      Please feel free to contact me with any questions or concerns you have.      Keep up the good work!!      Nicky Zhao, PharmD  [phone] in clinic on Tuesday and Wednesday